# Patient Record
(demographics unavailable — no encounter records)

---

## 2017-01-01 NOTE — HP
- Maternal History


Mother's Age: 38 yo


 Status: 


Mother's Blood Type: A positive


HBSAG: Negative


Date: 17


RPR: Negative


Date: 17


Group B Strep: Unknown


GBS Treated in Labor: No


HIV: Negative





- Maternal Risks


OB Risks: C/S - 2007 @ 36weeks-PUPPS.  17 SPAB.  Pancreatitis & 

gallstones -S/P Cholecystectomy .  Asthma - last attack years ago





[place] Data





- Admission


Date of Admission: 10/02/17


Admission Time: 10:12


Date of Delivery: 10/02/17


Time of Delivery: 10:00


Wks Gestation by Dates: 38.1


Infant Gender: Female


Type of Delivery: Repeat C/S


Reason for C Section: Cholestasis


Apgar Score @1 Minute: 9


Apgar score @ 5 Minutes: 9


Birth Weight: 7 lb 1.935 oz


Birth Length: 19 in


Head Circumference, Admission: 35


Chest Circumference: 33


Abdominal Girth: 33





- Vital Signs


  ** Left Upper Arm


Blood Pressure: 64/30


Blood Pressure Mean: 41





  ** Left Calf


Blood Pressure: 59/36


Blood Pressure Mean: 43





  ** Right Upper Arm


Blood Pressure: 68/45


Blood Pressure Mean: 52





  ** Right Calf


Blood Pressure: 64/38


Blood Pressure Mean: 46





- Labs


Labs: 


 Baby's Blood Type, Robin











Cord Blood Type  A NEGATIVE   10/02/17  10:00    


 


KRYSTYNA, Poly Interpret  Negative  (NEGATIVE)   10/02/17  10:00    














- [facility] Screening


 Screening Card Number: [id number]





[place] Infant, Physical Exam





-  Infant, Admission Exam


Birth Weight: 7 lb 1.935 oz


Birth Length: 19 in


Chest Circumference: 33


Initial Vital Signs: 


 Initial Vital Signs











Temp Pulse Resp


 


 98 F   142   48 


 


 10/02/17 11:15  10/02/17 11:15  10/02/17 11:15











General Appearance: Yes: No Abnormalities


Skin: Yes: No Abnormalities


Head: Yes: No Abnormalities


Eyes: Yes: No Abnormalities, Pupils equal, Red reflex present


Ears: Yes: No Abnormalities


Nose: Yes: No Abnormalities


Mouth: Yes: No Abnormalities


Chest: Yes: No Abnormalities


Lungs/Respiratory: Yes: No Abnormalities, Clear, Bilateral good air entry


Cardiac: Yes: No Abnormalities, Peripheral pulses strong


Abdomen: Yes: No Abnormalities


Gastrointestinal: Yes: No Abnormalities


Genitalia: No Abnormalities


Anus: Yes: No Abnormalities


Extremities: Yes: No Abnormalities, 10 Fingers, 10 Toes


Clavicles: No abnormalities


Ortolani Test: Negative


Foley Test: Negative


Spine: Yes: No Abnormalities


Reflexes: [place]: Present, Rooting: Present, Sucking: Present


Neuro: Yes: No Abnormalities


Cry: Yes: No Abnormalities, Strong





- Other Findings/Remarks


Other Findings/Remarks: 


ft aga baby girl born by repeat section 


maternal h/o of cholestasis


PLANS Routine  care

## 2017-01-01 NOTE — DS
- Maternal History


Mother's Age: 40 yo


 Status: 


Mother's Blood Type: A positive


HBSAG: Negative


Date: 17


RPR: Negative


Date: 17


Group B Strep: Unknown


GBS Treated in Labor: No


HIV: Negative





- Maternal Risks


OB Risks: C/S - 2007 @ 36weeks-PUPPS.  17 SPAB.  Pancreatitis & 

gallstones -S/P Cholecystectomy .  Asthma - last attack years ago





[place] Data





- Admission


Date of Admission: 10/02/17


Admission Time: 10:12


Date of Delivery: 10/02/17


Time of Delivery: 10:00


Wks Gestation by Dates: 38.1


Infant Gender: Female


Type of Delivery: Repeat C/S


Reason for C Section: Cholestasis


Apgar Score @1 Minute: 9


Apgar score @ 5 Minutes: 9


Birth Weight: 7 lb 1.935 oz


Birth Length: 19 in


Head Circumference, Admission: 35


Chest Circumference: 33


Abdominal Girth: 33





- Vital Signs


  ** Left Upper Arm


Blood Pressure: 64/30


Blood Pressure Mean: 41





  ** Left Calf


Blood Pressure: 59/36


Blood Pressure Mean: 43





  ** Right Upper Arm


Blood Pressure: 68/45


Blood Pressure Mean: 52





  ** Right Calf


Blood Pressure: 64/38


Blood Pressure Mean: 46





- Hearing Screen


Left Ear: Passed


Right Ear: Passed


Hearing Screen Complete: 10/03/17





- Labs


Labs: 


 Baby's Blood Type, Robin











Cord Blood Type  A NEGATIVE   10/02/17  10:00    


 


KRYSTYNA, Poly Interpret  Negative  (NEGATIVE)   10/02/17  10:00    














- [facility] Screening


 Screening Card Number: [id number]





[place] PE, Discharge





- Physical Exam


Last Weight Documented: 6 lb 7.2 oz


Vital Signs: 


 Vital Signs











Temperature  98.2 F   10/04/17 16:30


 


Pulse Rate  142   10/02/17 11:15


 


Respiratory Rate  48   10/02/17 11:15


 


Blood Pressure  64/30   10/02/17 19:02


 


O2 Sat by Pulse Oximetry (%)  100   10/02/17 11:31








 SpO2





Preductal SpO2, Right Arm        98


Postductal SpO2 [Left Leg]       97








General Appearance: Yes: No Abnormalities


Skin: Yes: No Abnormalities


Head: Yes: No Abnormalities, Molding


Eyes: Yes: No Abnormalities, Pupils equal, Red reflex present


Ears: Yes: No Abnormalities


Nose: Yes: No Abnormalities


Mouth: Yes: No Abnormalities


Chest: Yes: No Abnormalities


Lungs/Respiratory: Yes: No Abnormalities, Clear, Bilateral good air entry


Cardiac: Yes: No Abnormalities, Peripheral pulses strong


Abdomen: Yes: No Abnormalities


Gastrointestinal: Yes: No Abnormalities


Genitalia: No Abnormalities


Genitalia, Female: Yes: Labia Normal


Anus: Yes: No Abnormalities


Extremities: Yes: No Abnormalities, 10 Fingers, 10 Toes


Spine: Yes: No Abnormalities


Reflexes: [place]: Present, Rooting: Present, Sucking: Present


Neuro: Yes: No Abnormalities


Cry: Yes: No Abnormalities, Strong


Preductal SpO2, Right Arm: 98


  ** Left Leg


Postductal SpO2: 97


Other Findings/Remarks: 


ft aga b/g doing well


PLANS routine new born care


Discharge home [place] 10-15-17


to be seen in the office in 1 wk





Discharge Summary


Current Active Problems





[place] (Acute)

## 2017-01-01 NOTE — PN
[place], Progress Note





- [place] Exam


Weight: 6 lb 11.6 oz


Chest Circumference: 33


Head Circumference: 35


Vital Signs: 


 Vital Signs











Temperature  98.7 F   10/03/17 15:00


 


Pulse Rate  142   10/02/17 11:15


 


Respiratory Rate  48   10/02/17 11:15


 


Blood Pressure  64/30   10/02/17 19:02


 


O2 Sat by Pulse Oximetry (%)  100   10/02/17 11:31











General Appearance: Yes: No Abnormalities


Skin: Yes: No Abnormalities


Head: Yes: No Abnormalities


Eyes: Yes: No Abnormalities, Pupils equal, Red reflex present


Ears: Yes: No Abnormalities


Nose: Yes: No Abnormalities


Mouth: Yes: No Abnormalities


Chest: Yes: No Abnormalities


Lungs/Respiratory: Yes: No Abnormalities, Clear, Bilateral good air entry


Cardiac: Yes: No Abnormalities, Peripheral pulses strong


Abdomen: Yes: No Abnormalities


Gastrointestinal: Yes: No Abnormalities


Genitalia: No Abnormalities


Anus: Yes: No Abnormalities


Extremities: Yes: No Abnormalities, 10 Fingers, 10 Toes


Foley Test: Negative


Ortolani Test: Negative


Spine: Yes: No Abnormalities


Reflexes: [place]: Present, Rooting: Present, Sucking: Present


Neuro: Yes: No Abnormalities


Cry: No Abnormalities, Strong





- Other Data/Findings


Labs, Other Data: 


 Output





Number of Voids                  0


Number of Voids                  0


Number of Voids                  1


Number of Voids                  0


Number of Voids                  1


Number of Voids                  0


Number of Voids                  1


Number of Voids                  1


Stool Size                       Moderate


 Stool Description        Green,Soft





 Baby's Blood Type, Robin











Cord Blood Type  A NEGATIVE   10/02/17  10:00    


 


KRYSTYNA, Poly Interpret  Negative  (NEGATIVE)   10/02/17  10:00    











Other Findings/Remarks: 


well baby 


voiding and stooling

## 2017-01-01 NOTE — CONSULT
- Maternal History


Mother's Age: 40 yo


 Status: 


Mother's Blood Type: A positive


HBSAG: Negative


Date: 17


RPR: Negative


Date: 17


Group B Strep: Unknown


GBS Treated in Labor: No


HIV: Negative





- Maternal Risks


OB Risks: C/S - 2007 @ 36weeks-PUPPS.  17 SPAB.  Pancreatitis & 

gallstones -S/P Cholecystectomy .  Asthma - last attack years ago





[place] Data





- Admission


Date of Admission: 10/02/17


Admission Time: 10:12


Date of Delivery: 10/02/17


Time of Delivery: 10:00


Wks Gestation by Dates: 38.1


Infant Gender: Female


Type of Delivery: Repeat C/S


Reason for C Section: Cholestasis


Apgar Score @1 Minute: 9


Apgar score @ 5 Minutes: 9


Birth Weight: 3.23 kg


Birth Length: 48.26 cm


Head Circumference, Admission: 35


Chest Circumference: 33


Abdominal Girth: 33





- Labs


Labs: 


 Baby's Blood Type, Robin











Cord Blood Type  A NEGATIVE   10/02/17  10:00    


 


KRYSTYNA, Poly Interpret  Negative  (NEGATIVE)   10/02/17  10:00    














Level 2, History and Physical


[place] History: 


Ex 38 weeks male, born via Csection- repeat, to a 40 yo  with cholestasis 

of pregnancy, prenatal labs negative (except for GBS- unknown). Babay was 

vigorous at birth, strong cry, good tone, good respiratory efforts. Was dried 

and stimulated and received routine care in the OR. Apgars 9/9 at 1 and 5 min. 

3 vessel cord. 





- [place] Infant


Birth Weight: 3.23 kg


Birth Length: 48.26 cm


Vital Signs: 


 Vital Signs











Temperature  37.7 C H  10/02/17 13:30


 


Pulse Rate  142   10/02/17 11:15


 


Respiratory Rate  48   10/02/17 11:15


 


Blood Pressure      


 


O2 Sat by Pulse Oximetry (%)  100   10/02/17 11:31











Chest Circumference: 33


General Appearance: Yes: No Abnormalities, Full ROM, Spontaneous movements


Skin: Yes: No Abnormalities, [place]


Head: Yes: No Abnormalities


Mouth: Yes: No Abnormalities


Chest: Yes: No Abnormalities, Symmetrical


Lungs/Respiratory: Yes: No Abnormalities, Clear, Bilateral good air entry


Cardiac: Yes: No Abnormalities


Abdomen: Yes: No Abnormalities, Umb Ves, 2 artery 1 vein


Genitalia: No Abnormalities


Extremities: Yes: 10 Fingers, 10 Toes


Spine: Yes: No Abnormalities


Cry: Yes: Strong





Problem List





- Problems


(1) 


Code(s): Z38.2 - SINGLE LIVEBORN INFANT, UNSPECIFIED AS TO PLACE OF BIRTH   








Assessment/Plan


FT , AGA female born via Csection- repeat, Apgars 9,9. Recommend routibe care 

in well baby nursery.